# Patient Record
(demographics unavailable — no encounter records)

---

## 2024-11-26 NOTE — HISTORY OF PRESENT ILLNESS
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  The patient has been followed up with serial CAT scans every 2 years.  Today the patient presents to with a CT scan that shows coil embolization's in the splenic artery.

## 2024-11-26 NOTE — DATA REVIEWED
[FreeTextEntry1] : CT scan noncontrast from 11/4/2024  shows a heavily calcified splenic lesion 2.8 to 3.0 cm unchanged overlying scarring of the spleen unchanged embolization material in the region of the splenic artery which could not be visualized as a result unchanged 10.9 mm calcified splenic artery aneurysm unchanged

## 2024-11-26 NOTE — ASSESSMENT
[FreeTextEntry1] : The patient is a 75-year-old female who denies claudication symptoms abdominal pain or pelvic pain presents for follow-up of the CT scan results.  The patient had a history of a splenic artery coil embolization 25 years ago.  I reviewed the CT scan from my standpoint no vascular surgery intervention warranted at this time her aneurysm is stable and embolized.  I would like to see the patient back in my office in 2 to 3 years time with a repeat CAT scan of the abdomen.  I, Dr. José Luis Roth, personally performed the evaluation and management (E/M) services for this new patient. That E/M includes conducting the clinically appropriate initial history &/or exam, assessing all conditions, and establishing the plan of care. Today, my ROWAN, Sho Mckeon PA-C, was here to observe my evaluation and management service for this patient & follow plan of care established by me going forward.

## 2024-12-20 NOTE — PROCEDURE
[FreeTextEntry1] : Labs: Note: 2/17/21 topiramate 5.5 (100mg/day). 6/5/21 Vit. B12 344. 11/29/21 B12 436. 11/28/22 topiramate 9.5 (300mg/day), B12 644. 12/13/24 topiramate 19.2 (300mg/day), normal BUN/Cr.  Imaging: CT Scan: Note: 9/16/15 CTA head: no aneurysm. 6/10/17 CT: reported no acute changes, chronic ischemic small vessel disease. 1/11/23 cervical spine CT: no severe neural compromise. 1/11/23 brain CT: reported a small new lesion in jaquan. 2/2/23 CT head with contrast: reported jaquan lesion no longer present. Left VA calcified. Images reviewed. 4/17/23 CTA head and neck: reported unremarkable. Images reviewed.  Neurophysiological Testing - Note: 9/25/15 EEG: normal. 6/27/17 EEG: normal.

## 2024-12-20 NOTE — DISCUSSION/SUMMARY
[FreeTextEntry1] : She is not interested in trying Botox or other muscle relaxant. Continue low dosage of clonazepam. Add low dosage of ropinirole. Continue same dosage of topiramate. Re-order B12 level.

## 2024-12-20 NOTE — PHYSICAL EXAM
[FreeTextEntry1] : General Appearance - Well groomed, Not in acute distress. Build & Nutrition - Well nourished.  Head and Neck Head - normocephalic, atraumatic with no lesions or palpable masses. Note: tenderness of left more than right occipital nerve  Neurologic Mental Status Affect - normal. Speech - Normal. Thought content/perception - Normal. Cognitive function - Normal. Cranial Nerves I Olfactory - Normal. II Optic - Visual fields - Normal. III Oculomotor - Normal Bilaterally. IV Trochlear - Bilateral - Normal - Bilateral. V Trigeminal - Normal Bilaterally. VI Abducens - Bilateral - Normal - Bilateral. VII Facial - Normal Bilaterally. VIII Acoustic - Bilateral - Hearing normal - Bilateral. IX Glossopharyngeal / X Vagus - Normal. XI Accessory - Normal Bilaterally. XII Hypoglossal - Bilateral - Normal - Bilateral. Sensory - Normal. Motor Bulk and Contour - Normal. Tone - Normal. Strength - 5/5 normal muscle strength - All Muscles. Involuntary Movements - Dystonia - Note: left more than right posterior and lateral neck muscles, mild right laterocollis. Reflexes (Dermatomes) 2/2 Normal - All. Plantar Reflexes (L4-S2) - Bilateral - Flexion - Bilateral. Coordination - Normal. Gait - Normal.

## 2024-12-20 NOTE — HISTORY OF PRESENT ILLNESS
[FreeTextEntry1] : On topiramate, 300mg/day. Good tolerance to current dosage. Headache was reasonably controlled until the environmental allergens got dense. Maintaining B12 supplement. B12 was not checked in recent lab testing.  On clonazepam, she had side effect of lethargy by taking 0.5mg at night. She takes 0.25 mg nightly. She does not take day time dose. However, pain relief is less than effective. She does not have obvious symptoms during the day.  She had poor tolerance to tizanidine. Not considering Botox.  In general, balance is not good, though not worse. No fall.  She developed GI side effects from usage of indomethacin in the past.  Denies visual, or cognitive symptoms.

## 2025-06-03 NOTE — BEGINNING OF VISIT
[0] : 2) Feeling down, depressed, or hopeless: Not at all (0) [Never] : Never [Patient/Caregiver unclear of wishes] : Patient/Caregiver unclear of wishes [PHQ-2 Negative] : PHQ-2 Negative [[id number]] : 0 [Date Discussed (MM/DD/YY): ___] : Discussed: [unfilled]

## 2025-06-03 NOTE — ASSESSMENT
[FreeTextEntry1] :  74 yo F presents to the office today for initial consultation for Anemia. Referred by PCP . This is not the first time she is being told she was anemic. She used to see  for REZA 8 years ago. She has gotten Iron Infusions with her before. She has taken PO Iron before but causes GI upset and constipations. She had a BLT when once when she had a hysterectomy 45 years ago. Menstrual cycle- Hysterectomy at 29. Diet- Has a well balance diet and eats green vegetables and red meats. Pt states fatigue, SOB on exertion, headaches.   Impression: Anemia, REZA    Plan: Previous chart and previous labs reviewed. Labs on 5/13/25 reviewed and discussed with patient. WBC 7.86, Hgb 8.7, Hct 30.4, , MCV 79 Ferritin 9, Iron Serum 30, Iron sat 9, TIBC 350. --- Labs to be done 3 days prior when they RTC @ other [facility]: CBC, Ferritin, TIBC, B12, Folate, MMA --- Schedule for Iron Infusion Venofer 200mg IV weekly x5 --- Pt was educated on a high iron diet --- Pt advised to follow up with their OBGYN, PCP, GI as directed We explained possible side effect from Iron infusion is not limited to anaphylactic reaction, headache, hives, itching wheezing, difficulty breathing, a lightheaded feeling, swelling of face, lips, tongue or throat, delay reaction and abdominal discomfort. All questions were answered   RTC in 3 months with JEANNIE Shah

## 2025-06-03 NOTE — BEGINNING OF VISIT
[0] : 2) Feeling down, depressed, or hopeless: Not at all (0) [Never] : Never [Patient/Caregiver unclear of wishes] : Patient/Caregiver unclear of wishes [PHQ-2 Negative] : PHQ-2 Negative [[id number]] : 0 [Date Discussed (MM/DD/YY): ___] : Discussed: [unfilled]

## 2025-06-03 NOTE — HISTORY OF PRESENT ILLNESS
[de-identified] : 76 yo F presents to the office today for initial consultation for Anemia. Referred by PCP . Pt has a PMHx of IBS, HTN, DM, REZA, CAD, GERD, OA, Thyroid Cancer, Hiatal Hernia, MVP, Diverticulitis, Occipital Neuralgia, Non-alcoholic cirrhosis, Breast cancer  Pt has a PSHx see surgery list. Pt has a FHx of Breast Cancer, Leukemia, Lung Cancer  Social History: non-drinker, non-smoker, , 4 children    This is not the first time she is being told she was anemic She used to see  for REZA 8 years ago She has gotten Iron Infusions with her before  She has taken PO Iron before but causes GI upset and constipations  She had a BLT when once when she had a hysterectomy 45 years ago  Menstrual cycle- Hysterectomy at 29  Medication- See Med list  Allergies- See Allergy list   Diet- Has a well balance diet and eats green vegetables and red meats    Pt states fatigue, SOB on exertion, headaches,  Pt denies fever, diarrhea, chills, nausea, vomiting, dyspnea, unintentional weight loss or bleeding. Pt denies palpitations, weakness, dizziness, Pica.  Pt has not seen any blood in the stools or melena. No epistaxis, hematemesis or hemoptysis.   Healthcare Maintenace: PCP- . TIFFANIEGYN-    Neuro- ,Samir  GI- Dr.Frog  Colonoscopy- 2024 WNL  Upper Endoscopy- 2024 WNL Mammography- 2024, going in July GYN Pelvic Exam/pap- 2023  Breast Exam- 2024  LMP- Hysterectomy at 29  GPA- A0 (1 set of twins)     Labs on 25 reviewed and discussed with patient. WBC 7.86, Hgb 8.7, Hct 30.4, , MCV 79 Ferritin 9, Iron Serum 30, Iron sat 9, TIBC 350

## 2025-06-03 NOTE — HISTORY OF PRESENT ILLNESS
[de-identified] : 76 yo F presents to the office today for initial consultation for Anemia. Referred by PCP . Pt has a PMHx of IBS, HTN, DM, REZA, CAD, GERD, OA, Thyroid Cancer, Hiatal Hernia, MVP, Diverticulitis, Occipital Neuralgia, Non-alcoholic cirrhosis, Breast cancer  Pt has a PSHx see surgery list. Pt has a FHx of Breast Cancer, Leukemia, Lung Cancer  Social History: non-drinker, non-smoker, , 4 children    This is not the first time she is being told she was anemic She used to see  for REZA 8 years ago She has gotten Iron Infusions with her before  She has taken PO Iron before but causes GI upset and constipations  She had a BLT when once when she had a hysterectomy 45 years ago  Menstrual cycle- Hysterectomy at 29  Medication- See Med list  Allergies- See Allergy list   Diet- Has a well balance diet and eats green vegetables and red meats    Pt states fatigue, SOB on exertion, headaches,  Pt denies fever, diarrhea, chills, nausea, vomiting, dyspnea, unintentional weight loss or bleeding. Pt denies palpitations, weakness, dizziness, Pica.  Pt has not seen any blood in the stools or melena. No epistaxis, hematemesis or hemoptysis.   Healthcare Maintenace: PCP- . TIFFANIEGYN-    Neuro- ,Samir  GI- Dr.Frog  Colonoscopy- 2024 WNL  Upper Endoscopy- 2024 WNL Mammography- 2024, going in July GYN Pelvic Exam/pap- 2023  Breast Exam- 2024  LMP- Hysterectomy at 29  GPA- A0 (1 set of twins)     Labs on 25 reviewed and discussed with patient. WBC 7.86, Hgb 8.7, Hct 30.4, , MCV 79 Ferritin 9, Iron Serum 30, Iron sat 9, TIBC 350

## 2025-06-03 NOTE — BEGINNING OF VISIT
[0] : 2) Feeling down, depressed, or hopeless: Not at all (0) [Never] : Never [Patient/Caregiver unclear of wishes] : Patient/Caregiver unclear of wishes [PHQ-2 Negative] : PHQ-2 Negative [[id number]] : 0 [Date Discussed (MM/DD/YY): ___] : Discussed: [unfilled]

## 2025-06-03 NOTE — HISTORY OF PRESENT ILLNESS
[de-identified] : 76 yo F presents to the office today for initial consultation for Anemia. Referred by PCP . Pt has a PMHx of IBS, HTN, DM, REZA, CAD, GERD, OA, Thyroid Cancer, Hiatal Hernia, MVP, Diverticulitis, Occipital Neuralgia, Non-alcoholic cirrhosis, Breast cancer  Pt has a PSHx see surgery list. Pt has a FHx of Breast Cancer, Leukemia, Lung Cancer  Social History: non-drinker, non-smoker, , 4 children    This is not the first time she is being told she was anemic She used to see  for REZA 8 years ago She has gotten Iron Infusions with her before  She has taken PO Iron before but causes GI upset and constipations  She had a BLT when once when she had a hysterectomy 45 years ago  Menstrual cycle- Hysterectomy at 29  Medication- See Med list  Allergies- See Allergy list   Diet- Has a well balance diet and eats green vegetables and red meats    Pt states fatigue, SOB on exertion, headaches,  Pt denies fever, diarrhea, chills, nausea, vomiting, dyspnea, unintentional weight loss or bleeding. Pt denies palpitations, weakness, dizziness, Pica.  Pt has not seen any blood in the stools or melena. No epistaxis, hematemesis or hemoptysis.   Healthcare Maintenace: PCP- . TIFFANIEGYN-    Neuro- ,Samir  GI- Dr.Frog  Colonoscopy- 2024 WNL  Upper Endoscopy- 2024 WNL Mammography- 2024, going in July GYN Pelvic Exam/pap- 2023  Breast Exam- 2024  LMP- Hysterectomy at 29  GPA- A0 (1 set of twins)     Labs on 25 reviewed and discussed with patient. WBC 7.86, Hgb 8.7, Hct 30.4, , MCV 79 Ferritin 9, Iron Serum 30, Iron sat 9, TIBC 350

## 2025-06-03 NOTE — ASSESSMENT
[FreeTextEntry1] :  76 yo F presents to the office today for initial consultation for Anemia. Referred by PCP . This is not the first time she is being told she was anemic. She used to see  for REZA 8 years ago. She has gotten Iron Infusions with her before. She has taken PO Iron before but causes GI upset and constipations. She had a BLT when once when she had a hysterectomy 45 years ago. Menstrual cycle- Hysterectomy at 29. Diet- Has a well balance diet and eats green vegetables and red meats. Pt states fatigue, SOB on exertion, headaches.   Impression: Anemia, REZA    Plan: Previous chart and previous labs reviewed. Labs on 5/13/25 reviewed and discussed with patient. WBC 7.86, Hgb 8.7, Hct 30.4, , MCV 79 Ferritin 9, Iron Serum 30, Iron sat 9, TIBC 350. --- Labs to be done 3 days prior when they RTC @ other [facility]: CBC, Ferritin, TIBC, B12, Folate, MMA --- Schedule for Iron Infusion Venofer 200mg IV weekly x5 --- Pt was educated on a high iron diet --- Pt advised to follow up with their OBGYN, PCP, GI as directed We explained possible side effect from Iron infusion is not limited to anaphylactic reaction, headache, hives, itching wheezing, difficulty breathing, a lightheaded feeling, swelling of face, lips, tongue or throat, delay reaction and abdominal discomfort. All questions were answered   RTC in 3 months with JEANNIE Shah

## 2025-06-03 NOTE — REVIEW OF SYSTEMS
[Fatigue] : fatigue [SOB on Exertion] : shortness of breath during exertion [Negative] : Allergic/Immunologic [FreeTextEntry2] : headaches

## 2025-06-03 NOTE — HISTORY OF PRESENT ILLNESS
[de-identified] : 76 yo F presents to the office today for initial consultation for Anemia. Referred by PCP . Pt has a PMHx of IBS, HTN, DM, REZA, CAD, GERD, OA, Thyroid Cancer, Hiatal Hernia, MVP, Diverticulitis, Occipital Neuralgia, Non-alcoholic cirrhosis, Breast cancer  Pt has a PSHx see surgery list. Pt has a FHx of Breast Cancer, Leukemia, Lung Cancer  Social History: non-drinker, non-smoker, , 4 children    This is not the first time she is being told she was anemic She used to see  for REZA 8 years ago She has gotten Iron Infusions with her before  She has taken PO Iron before but causes GI upset and constipations  She had a BLT when once when she had a hysterectomy 45 years ago  Menstrual cycle- Hysterectomy at 29  Medication- See Med list  Allergies- See Allergy list   Diet- Has a well balance diet and eats green vegetables and red meats    Pt states fatigue, SOB on exertion, headaches,  Pt denies fever, diarrhea, chills, nausea, vomiting, dyspnea, unintentional weight loss or bleeding. Pt denies palpitations, weakness, dizziness, Pica.  Pt has not seen any blood in the stools or melena. No epistaxis, hematemesis or hemoptysis.   Healthcare Maintenace: PCP- . TIFFANIEGYN-    Neuro- ,Samir  GI- Dr.Frog  Colonoscopy- 2024 WNL  Upper Endoscopy- 2024 WNL Mammography- 2024, going in July GYN Pelvic Exam/pap- 2023  Breast Exam- 2024  LMP- Hysterectomy at 29  GPA- A0 (1 set of twins)     Labs on 25 reviewed and discussed with patient. WBC 7.86, Hgb 8.7, Hct 30.4, , MCV 79 Ferritin 9, Iron Serum 30, Iron sat 9, TIBC 350

## 2025-06-06 NOTE — PROCEDURE
[FreeTextEntry1] : Labs: Note: 2/17/21 topiramate 5.5 (100mg/day). 6/5/21 Vit. B12 344. 11/29/21 B12 436. 11/28/22 topiramate 9.5 (300mg/day), B12 644. 12/13/24 topiramate 19.2 (300mg/day), normal BUN/Cr. 2/7/25 B12 740.  Imaging: CT Scan: Note: 9/16/15 CTA head: no aneurysm. 6/10/17 CT: reported no acute changes, chronic ischemic small vessel disease. 1/11/23 cervical spine CT: no severe neural compromise. 1/11/23 brain CT: reported a small new lesion in jaquan. 2/2/23 CT head with contrast: reported jaquan lesion no longer present. Left VA calcified. Images reviewed. 4/17/23 CTA head and neck: reported unremarkable. Images reviewed.  Neurophysiological Testing - Note: 9/25/15 EEG: normal. 6/27/17 EEG: normal.

## 2025-06-06 NOTE — HISTORY OF PRESENT ILLNESS
[FreeTextEntry1] : On topiramate, 300mg/day. Good tolerance to current dosage. Headache was reasonably controlled until the environmental allergens got dense. Maintaining B12 supplement. B12 was checked since last visit.  On clonazepam, she had side effect of lethargy by taking 0.5mg at night. She takes 0.25 mg nightly. She does not take daytime dose. However, pain relief is less than effective. She has more pain during the day when she has to extend her neck or turns to the sides suddenly.  She had poor tolerance to tizanidine. Not considering Botox. She did not  the prescription of ropinirole since last visit because she spent the winter months in [place]. Would like to try now. She is also going to take 6 hour flight to [place] tomorrow. She would like to have rapid acting medication to help her to relax.   In general, balance is not good, though not worse. No fall.  She developed GI side effects from usage of indomethacin in the past.  Denies visual, or cognitive symptoms.

## 2025-06-06 NOTE — DISCUSSION/SUMMARY
[FreeTextEntry1] : Re-order ropinirole. Continue same usage of clonazepam. Two tabs of alprazolam for her flight. Continue same dosage of topiramate. Continue PO B12 supplement.